# Patient Record
Sex: FEMALE | Race: WHITE | NOT HISPANIC OR LATINO | URBAN - METROPOLITAN AREA
[De-identification: names, ages, dates, MRNs, and addresses within clinical notes are randomized per-mention and may not be internally consistent; named-entity substitution may affect disease eponyms.]

---

## 2023-05-26 LAB
ALANINE AMINOTRANSFERASE (SGPT) (U/L) IN SER/PLAS: 10 U/L (ref 7–45)
ALBUMIN (G/DL) IN SER/PLAS: 3.8 G/DL (ref 3.4–5)
ALKALINE PHOSPHATASE (U/L) IN SER/PLAS: 48 U/L (ref 33–136)
ANION GAP IN SER/PLAS: 10 MMOL/L (ref 10–20)
ASPARTATE AMINOTRANSFERASE (SGOT) (U/L) IN SER/PLAS: 15 U/L (ref 9–39)
BASOPHILS (10*3/UL) IN BLOOD BY AUTOMATED COUNT: 0.09 X10E9/L (ref 0–0.1)
BASOPHILS/100 LEUKOCYTES IN BLOOD BY AUTOMATED COUNT: 1.2 % (ref 0–2)
BILIRUBIN TOTAL (MG/DL) IN SER/PLAS: 0.4 MG/DL (ref 0–1.2)
CALCIUM (MG/DL) IN SER/PLAS: 9.5 MG/DL (ref 8.6–10.3)
CARBON DIOXIDE, TOTAL (MMOL/L) IN SER/PLAS: 28 MMOL/L (ref 21–32)
CHLORIDE (MMOL/L) IN SER/PLAS: 107 MMOL/L (ref 98–107)
CHOLESTEROL (MG/DL) IN SER/PLAS: 179 MG/DL (ref 0–199)
CHOLESTEROL IN HDL (MG/DL) IN SER/PLAS: 42.1 MG/DL
CHOLESTEROL/HDL RATIO: 4.3
CREATININE (MG/DL) IN SER/PLAS: 0.69 MG/DL (ref 0.5–1.05)
EOSINOPHILS (10*3/UL) IN BLOOD BY AUTOMATED COUNT: 0.2 X10E9/L (ref 0–0.7)
EOSINOPHILS/100 LEUKOCYTES IN BLOOD BY AUTOMATED COUNT: 2.6 % (ref 0–6)
ERYTHROCYTE DISTRIBUTION WIDTH (RATIO) BY AUTOMATED COUNT: 13.8 % (ref 11.5–14.5)
ERYTHROCYTE MEAN CORPUSCULAR HEMOGLOBIN CONCENTRATION (G/DL) BY AUTOMATED: 32.4 G/DL (ref 32–36)
ERYTHROCYTE MEAN CORPUSCULAR VOLUME (FL) BY AUTOMATED COUNT: 95 FL (ref 80–100)
ERYTHROCYTES (10*6/UL) IN BLOOD BY AUTOMATED COUNT: 4.31 X10E12/L (ref 4–5.2)
GFR FEMALE: >90 ML/MIN/1.73M2
GLUCOSE (MG/DL) IN SER/PLAS: 93 MG/DL (ref 74–99)
HEMATOCRIT (%) IN BLOOD BY AUTOMATED COUNT: 41 % (ref 36–46)
HEMOGLOBIN (G/DL) IN BLOOD: 13.3 G/DL (ref 12–16)
IMMATURE GRANULOCYTES/100 LEUKOCYTES IN BLOOD BY AUTOMATED COUNT: 0.1 % (ref 0–0.9)
LDL: 113 MG/DL (ref 0–99)
LEUKOCYTES (10*3/UL) IN BLOOD BY AUTOMATED COUNT: 7.8 X10E9/L (ref 4.4–11.3)
LYMPHOCYTES (10*3/UL) IN BLOOD BY AUTOMATED COUNT: 2.64 X10E9/L (ref 1.2–4.8)
LYMPHOCYTES/100 LEUKOCYTES IN BLOOD BY AUTOMATED COUNT: 33.8 % (ref 13–44)
MONOCYTES (10*3/UL) IN BLOOD BY AUTOMATED COUNT: 0.49 X10E9/L (ref 0.1–1)
MONOCYTES/100 LEUKOCYTES IN BLOOD BY AUTOMATED COUNT: 6.3 % (ref 2–10)
NEUTROPHILS (10*3/UL) IN BLOOD BY AUTOMATED COUNT: 4.38 X10E9/L (ref 1.2–7.7)
NEUTROPHILS/100 LEUKOCYTES IN BLOOD BY AUTOMATED COUNT: 56 % (ref 40–80)
PLATELETS (10*3/UL) IN BLOOD AUTOMATED COUNT: 158 X10E9/L (ref 150–450)
POTASSIUM (MMOL/L) IN SER/PLAS: 4.7 MMOL/L (ref 3.5–5.3)
PROTEIN TOTAL: 6.8 G/DL (ref 6.4–8.2)
SODIUM (MMOL/L) IN SER/PLAS: 140 MMOL/L (ref 136–145)
THYROTROPIN (MIU/L) IN SER/PLAS BY DETECTION LIMIT <= 0.05 MIU/L: 2.92 MIU/L (ref 0.44–3.98)
TRIGLYCERIDE (MG/DL) IN SER/PLAS: 118 MG/DL (ref 0–149)
UREA NITROGEN (MG/DL) IN SER/PLAS: 13 MG/DL (ref 6–23)
VLDL: 24 MG/DL (ref 0–40)

## 2023-07-27 ENCOUNTER — HOSPITAL ENCOUNTER (OUTPATIENT)
Dept: DATA CONVERSION | Facility: HOSPITAL | Age: 63
End: 2023-07-27
Attending: SURGERY | Admitting: SURGERY

## 2023-07-27 DIAGNOSIS — K43.9 VENTRAL HERNIA WITHOUT OBSTRUCTION OR GANGRENE: ICD-10-CM

## 2023-07-28 LAB
ATRIAL RATE: 52 BPM
P AXIS: 75 DEGREES
PR INTERVAL: 156 MS
Q ONSET: 254 MS
QRS COUNT: 8 BEATS
QRS DURATION: 97 MS
QT INTERVAL: 443 MS
QTC CALCULATION(BAZETT): 412 MS
QTC FREDERICIA: 422 MS
R AXIS: 40 DEGREES
T AXIS: 58 DEGREES
T OFFSET: 475 MS
VENTRICULAR RATE: 52 BPM

## 2023-09-29 VITALS — HEIGHT: 67 IN | BODY MASS INDEX: 22.66 KG/M2 | WEIGHT: 144.4 LBS

## 2023-09-30 NOTE — H&P
History & Physical Reviewed:   I have reviewed the History and Physical dated:  19-Jul-2023   History and Physical reviewed and relevant findings noted. Patient examined to review pertinent physical  findings.: No significant changes   Home Medications Reviewed: no changes noted   Allergies Reviewed: no changes noted       ERAS (Enhanced Recovery After Surgery):  ·  ERAS Patient: no     Consent:   COVID-19 Consent:  ·  COVID-19 Risk Consent Surgeon has reviewed key risks related to the risk of yemi COVID-19 and if they contract COVID-19 what the risks are.       Electronic Signatures:  Mirela Garcia)  (Signed 27-Jul-2023 09:00)   Authored: History & Physical Reviewed, ERAS, Consent,  Note Completion      Last Updated: 27-Jul-2023 09:00 by Mirela Garcia)

## 2023-10-02 NOTE — OP NOTE
PROCEDURE DETAILS    Preoperative Diagnosis:  Epigastric hernia    Postoperative Diagnosis:  Epigastric hernia    Surgeon: Mirela Garcia  Resident/Fellow/Other Assistant: Allan Fung    Procedure:  1.  open ventral hernia repair with mesh    Anesthesia: Costa  Estimated Blood Loss: 5  Findings: 1.5 x 1.5 cm fascial defect with preperitoneal protruding through the defect.  Specimens(s) Collected: no,     Complications: none  IV Fluids: 600  Patient Returned To/Condition: To PACU in stable condition        Operative Report:   Indication for the procedure: Mimi is a 63-year-old white female who presented with a palpable bulge in the mid epigastric region.  On physical examination,  this was a palpable adipose mass.  It was discussed that there may be a small ventral hernia below the mass.  Therefore, the benefits and risk of an open ventral hernia repair with mesh surgery was reviewed with the patient.  The area was marked in the  preop holding area and confirmed by the patient.    Details of the procedure: The patient was brought into the operating room and was laid in the supine position.  After placement under general anesthesia, MANDI hose and SCDs were placed on bilateral lower extremities.  The abdomen then was prepped with  ChloraPrep and draped in usual sterile fashion.  A pause was taken the correct patient procedure were identified.  At this time half percent Marcaine with epinephrine was used to locally anesthetize the area.  A 3 cm vertical incision was made in the  mid epigastric region right over the area of the palpable mass.  This incision was carried down to the level of the adipose tissue.  Using finger dissection, the adipose tissue associated with the hernia was dissected away from the surrounding soft tissue.   This did appear to be a small epigastric hernia which contained preperitoneal fat.  The hernia sac was removed and amputated at the level of the fascial defect.  Part of the  adipose tissue was amputated.  There was no bleeding on the edge.  The rest  of the adipose tissue was reintroduced into the abdominal cavity.  Using digital manipulation, the undersurface of the posterior fascia was palpated and a few adhesions were broken up.  A 4.3 [place] mesh with strap then was used to repair the hernia defect.   The mesh was introduced into the intra-abdominal cavity with the rough side against the posterior fascia, and the smooth side against the omentum/liver edge.  The mesh was sewn in position using 2 figure-of-eight stitches of 0 Ethibond sutures.  The  sutures incorporated the mesh into the fascial closure.  There was fascia covering all of the mesh at the end.  The strap was cut close to the fascial edge defect, and the sutures were tied.  Half percent Marcaine was injected into the fascia for postoperative  pain control.  The wound was irrigated.  No evidence of bleeding.  The deep soft tissue was reapproximated using 2 interrupted stitches of 2-0 Vicryl suture.  The skin was closed using a running subcuticular stitch of 4-0 Vicryl suture.  The wound then  was dressed with Steri-Strips, 4 x 4 and tape.  She tolerated the procedure well.  She then was awoken from general anesthesia and taken to the recovery room in stable condition.    Complications: None  Counts: Correct x2  Drains: None.                        Attestation:   Note Completion:  Attending Attestation I was present for the entire procedure         Electronic Signatures:  Mirela Garcia)  (Signed 27-Jul-2023 10:18)   Authored: Post-Operative Note, Chart Review, Note Completion      Last Updated: 27-Jul-2023 10:18 by Mirela Garcia)

## 2024-05-29 ENCOUNTER — LAB (OUTPATIENT)
Dept: LAB | Facility: LAB | Age: 64
End: 2024-05-29
Payer: MEDICARE

## 2024-05-29 ENCOUNTER — APPOINTMENT (OUTPATIENT)
Dept: RADIOLOGY | Facility: HOSPITAL | Age: 64
End: 2024-05-29
Payer: MEDICARE

## 2024-05-29 DIAGNOSIS — K58.1 IRRITABLE BOWEL SYNDROME WITH CONSTIPATION: ICD-10-CM

## 2024-05-29 DIAGNOSIS — Z12.31 ENCOUNTER FOR SCREENING MAMMOGRAM FOR MALIGNANT NEOPLASM OF BREAST: ICD-10-CM

## 2024-05-29 DIAGNOSIS — M25.50 PAIN IN UNSPECIFIED JOINT: ICD-10-CM

## 2024-05-29 DIAGNOSIS — R14.0 ABDOMINAL DISTENSION (GASEOUS): ICD-10-CM

## 2024-05-29 DIAGNOSIS — Z00.00 ENCOUNTER FOR GENERAL ADULT MEDICAL EXAMINATION WITHOUT ABNORMAL FINDINGS: Primary | ICD-10-CM

## 2024-05-29 DIAGNOSIS — N64.89 OTHER SPECIFIED DISORDERS OF BREAST: ICD-10-CM

## 2024-05-29 DIAGNOSIS — F41.9 ANXIETY DISORDER, UNSPECIFIED: ICD-10-CM

## 2024-05-29 LAB
ALBUMIN SERPL BCP-MCNC: 4 G/DL (ref 3.4–5)
ALP SERPL-CCNC: 54 U/L (ref 33–136)
ALT SERPL W P-5'-P-CCNC: 11 U/L (ref 7–45)
ANION GAP SERPL CALC-SCNC: 10 MMOL/L (ref 10–20)
AST SERPL W P-5'-P-CCNC: 15 U/L (ref 9–39)
BASOPHILS # BLD AUTO: 0.06 X10*3/UL (ref 0–0.1)
BASOPHILS NFR BLD AUTO: 0.8 %
BILIRUB SERPL-MCNC: 0.3 MG/DL (ref 0–1.2)
BUN SERPL-MCNC: 25 MG/DL (ref 6–23)
CALCIUM SERPL-MCNC: 9.6 MG/DL (ref 8.6–10.3)
CHLORIDE SERPL-SCNC: 102 MMOL/L (ref 98–107)
CHOLEST SERPL-MCNC: 192 MG/DL (ref 0–199)
CHOLESTEROL/HDL RATIO: 4.4
CO2 SERPL-SCNC: 29 MMOL/L (ref 21–32)
CREAT SERPL-MCNC: 0.84 MG/DL (ref 0.5–1.05)
EGFRCR SERPLBLD CKD-EPI 2021: 77 ML/MIN/1.73M*2
EOSINOPHIL # BLD AUTO: 0.22 X10*3/UL (ref 0–0.7)
EOSINOPHIL NFR BLD AUTO: 3 %
ERYTHROCYTE [DISTWIDTH] IN BLOOD BY AUTOMATED COUNT: 13 % (ref 11.5–14.5)
GLUCOSE SERPL-MCNC: 104 MG/DL (ref 74–99)
HCT VFR BLD AUTO: 43.6 % (ref 36–46)
HDLC SERPL-MCNC: 43.6 MG/DL
HGB BLD-MCNC: 14.4 G/DL (ref 12–16)
IMM GRANULOCYTES # BLD AUTO: 0.01 X10*3/UL (ref 0–0.7)
IMM GRANULOCYTES NFR BLD AUTO: 0.1 % (ref 0–0.9)
LDLC SERPL CALC-MCNC: 125 MG/DL
LYMPHOCYTES # BLD AUTO: 2.66 X10*3/UL (ref 1.2–4.8)
LYMPHOCYTES NFR BLD AUTO: 35.8 %
MCH RBC QN AUTO: 30.8 PG (ref 26–34)
MCHC RBC AUTO-ENTMCNC: 33 G/DL (ref 32–36)
MCV RBC AUTO: 93 FL (ref 80–100)
MONOCYTES # BLD AUTO: 0.59 X10*3/UL (ref 0.1–1)
MONOCYTES NFR BLD AUTO: 7.9 %
NEUTROPHILS # BLD AUTO: 3.89 X10*3/UL (ref 1.2–7.7)
NEUTROPHILS NFR BLD AUTO: 52.4 %
NON HDL CHOLESTEROL: 148 MG/DL (ref 0–149)
NRBC BLD-RTO: 0 /100 WBCS (ref 0–0)
PLATELET # BLD AUTO: 174 X10*3/UL (ref 150–450)
POTASSIUM SERPL-SCNC: 5.1 MMOL/L (ref 3.5–5.3)
PROT SERPL-MCNC: 6.8 G/DL (ref 6.4–8.2)
RBC # BLD AUTO: 4.67 X10*6/UL (ref 4–5.2)
SODIUM SERPL-SCNC: 136 MMOL/L (ref 136–145)
TRIGL SERPL-MCNC: 117 MG/DL (ref 0–149)
TSH SERPL-ACNC: 4.83 MIU/L (ref 0.44–3.98)
VLDL: 23 MG/DL (ref 0–40)
WBC # BLD AUTO: 7.4 X10*3/UL (ref 4.4–11.3)

## 2024-05-29 PROCEDURE — 80061 LIPID PANEL: CPT

## 2024-05-29 PROCEDURE — 80053 COMPREHEN METABOLIC PANEL: CPT

## 2024-05-29 PROCEDURE — 36415 COLL VENOUS BLD VENIPUNCTURE: CPT

## 2024-05-29 PROCEDURE — 85025 COMPLETE CBC W/AUTO DIFF WBC: CPT

## 2024-05-29 PROCEDURE — 84443 ASSAY THYROID STIM HORMONE: CPT

## 2024-06-12 ENCOUNTER — HOSPITAL ENCOUNTER (OUTPATIENT)
Dept: RADIOLOGY | Facility: HOSPITAL | Age: 64
Discharge: HOME | End: 2024-06-12
Payer: COMMERCIAL

## 2024-06-12 VITALS — WEIGHT: 145 LBS | HEIGHT: 67 IN | BODY MASS INDEX: 22.76 KG/M2

## 2024-06-12 DIAGNOSIS — Z12.31 ENCOUNTER FOR SCREENING MAMMOGRAM FOR MALIGNANT NEOPLASM OF BREAST: ICD-10-CM

## 2024-06-12 PROCEDURE — 77063 BREAST TOMOSYNTHESIS BI: CPT

## 2024-06-12 PROCEDURE — 77067 SCR MAMMO BI INCL CAD: CPT

## 2024-10-13 ENCOUNTER — OFFICE VISIT (OUTPATIENT)
Dept: URGENT CARE | Age: 64
End: 2024-10-13
Payer: MEDICARE

## 2024-10-13 VITALS
SYSTOLIC BLOOD PRESSURE: 126 MMHG | OXYGEN SATURATION: 96 % | RESPIRATION RATE: 18 BRPM | HEART RATE: 55 BPM | DIASTOLIC BLOOD PRESSURE: 81 MMHG

## 2024-10-13 DIAGNOSIS — R10.10 PAIN OF UPPER ABDOMEN: Primary | ICD-10-CM

## 2024-10-13 RX ORDER — ESTRADIOL 0.05 MG/D
PATCH TRANSDERMAL
COMMUNITY

## 2024-10-13 RX ORDER — ALPRAZOLAM 0.5 MG/1
0.5 TABLET ORAL 4 TIMES DAILY
COMMUNITY

## 2024-10-13 ASSESSMENT — ENCOUNTER SYMPTOMS
APPETITE CHANGE: 1
VOMITING: 1
ABDOMINAL PAIN: 1
CONSTIPATION: 1

## 2024-10-13 NOTE — PROGRESS NOTES
Subjective   Patient ID: Mimi Trivedi is a 64 y.o. female. They present today with a chief complaint of Vomiting and Abdominal Pain (4 times in the past 24 hours , last BM 10 days out. Pain 10/10 /Current ventral hernia ).    History of Present Illness  Patient describes 24 hours of abdominal she has not had a regular bowel movement.  Endorses a small this morning after taking juice.  She has had vomiting.  She has a history of multiple abdominal surgeries in the past.      Vomiting  Associated symptoms: abdominal pain    Abdominal Pain  Associated symptoms include constipation and vomiting.       Past Medical History  Allergies as of 10/13/2024    (No Known Allergies)       (Not in a hospital admission)       Past Medical History:   Diagnosis Date    Personal history of other mental and behavioral disorders     History of anxiety       Past Surgical History:   Procedure Laterality Date    OTHER SURGICAL HISTORY  03/10/2021    Hysterectomy total    OTHER SURGICAL HISTORY  03/10/2021    Nephrectomy    OTHER SURGICAL HISTORY  05/24/2022    Colonoscopy            Review of Systems  Review of Systems   Constitutional:  Positive for appetite change.   Gastrointestinal:  Positive for abdominal pain, constipation and vomiting.                                  Objective    Vitals:    10/13/24 0913   BP: 126/81   Pulse: 55   Resp: 18   SpO2: 96%     No LMP recorded. Patient has had a hysterectomy.    Physical Exam  Vitals and nursing note reviewed.   Constitutional:       General: She is not in acute distress.     Appearance: Normal appearance. She is normal weight. She is not ill-appearing or toxic-appearing.      Comments: Alert pleasant cooperative   HENT:      Head: Normocephalic and atraumatic.      Nose: Nose normal.      Mouth/Throat:      Mouth: Mucous membranes are moist.   Cardiovascular:      Rate and Rhythm: Normal rate.   Abdominal:      General: Abdomen is flat.      Palpations: Abdomen is soft.       Tenderness: There is abdominal tenderness.   Skin:     General: Skin is warm and dry.   Neurological:      Mental Status: She is alert.         Procedures    Point of Care Test & Imaging Results from this visit  No results found for this visit on 10/13/24.   No results found.    Diagnostic study results (if any) were reviewed by CORKY Figueroa.    Assessment/Plan   Allergies, medications, history, and pertinent labs/EKGs/Imaging reviewed by CORKY Figueroa.     Medical Decision Making  This is a no charge visit.    History and physical exam are consistent with a possible acute abdomen.  Differential includes small bowel obstruction which needs to be ruled out.  She was advised to go to the emergency department.  Her  is here and will drive her.    Orders and Diagnoses  There are no diagnoses linked to this encounter.    Medical Admin Record      Patient disposition: ED    Electronically signed by CORKY Figueroa  9:17 AM

## 2024-10-21 ENCOUNTER — APPOINTMENT (OUTPATIENT)
Dept: RADIOLOGY | Facility: HOSPITAL | Age: 64
End: 2024-10-21
Payer: MEDICARE

## 2024-10-21 ENCOUNTER — HOSPITAL ENCOUNTER (EMERGENCY)
Facility: HOSPITAL | Age: 64
Discharge: HOME | End: 2024-10-21
Payer: MEDICARE

## 2024-10-21 VITALS
OXYGEN SATURATION: 98 % | WEIGHT: 144 LBS | TEMPERATURE: 98.5 F | RESPIRATION RATE: 18 BRPM | HEART RATE: 56 BPM | SYSTOLIC BLOOD PRESSURE: 130 MMHG | HEIGHT: 67 IN | BODY MASS INDEX: 22.6 KG/M2 | DIASTOLIC BLOOD PRESSURE: 81 MMHG

## 2024-10-21 DIAGNOSIS — K59.00 CONSTIPATION, UNSPECIFIED CONSTIPATION TYPE: Primary | ICD-10-CM

## 2024-10-21 LAB
ALBUMIN SERPL BCP-MCNC: 4.1 G/DL (ref 3.4–5)
ALP SERPL-CCNC: 64 U/L (ref 33–136)
ALT SERPL W P-5'-P-CCNC: 10 U/L (ref 7–45)
ANION GAP SERPL CALC-SCNC: 11 MMOL/L (ref 10–20)
AST SERPL W P-5'-P-CCNC: 14 U/L (ref 9–39)
BASOPHILS # BLD AUTO: 0.05 X10*3/UL (ref 0–0.1)
BASOPHILS NFR BLD AUTO: 0.6 %
BILIRUB SERPL-MCNC: 0.2 MG/DL (ref 0–1.2)
BUN SERPL-MCNC: 12 MG/DL (ref 6–23)
CALCIUM SERPL-MCNC: 9 MG/DL (ref 8.6–10.3)
CHLORIDE SERPL-SCNC: 101 MMOL/L (ref 98–107)
CO2 SERPL-SCNC: 29 MMOL/L (ref 21–32)
CREAT SERPL-MCNC: 0.79 MG/DL (ref 0.5–1.05)
EGFRCR SERPLBLD CKD-EPI 2021: 83 ML/MIN/1.73M*2
EOSINOPHIL # BLD AUTO: 0.15 X10*3/UL (ref 0–0.7)
EOSINOPHIL NFR BLD AUTO: 1.7 %
ERYTHROCYTE [DISTWIDTH] IN BLOOD BY AUTOMATED COUNT: 13.2 % (ref 11.5–14.5)
GLUCOSE SERPL-MCNC: 112 MG/DL (ref 74–99)
HCT VFR BLD AUTO: 41.1 % (ref 36–46)
HGB BLD-MCNC: 13.7 G/DL (ref 12–16)
IMM GRANULOCYTES # BLD AUTO: 0.02 X10*3/UL (ref 0–0.7)
IMM GRANULOCYTES NFR BLD AUTO: 0.2 % (ref 0–0.9)
LACTATE SERPL-SCNC: 0.8 MMOL/L (ref 0.4–2)
LIPASE SERPL-CCNC: 46 U/L (ref 9–82)
LYMPHOCYTES # BLD AUTO: 2.76 X10*3/UL (ref 1.2–4.8)
LYMPHOCYTES NFR BLD AUTO: 31 %
MCH RBC QN AUTO: 30.6 PG (ref 26–34)
MCHC RBC AUTO-ENTMCNC: 33.3 G/DL (ref 32–36)
MCV RBC AUTO: 92 FL (ref 80–100)
MONOCYTES # BLD AUTO: 0.56 X10*3/UL (ref 0.1–1)
MONOCYTES NFR BLD AUTO: 6.3 %
NEUTROPHILS # BLD AUTO: 5.37 X10*3/UL (ref 1.2–7.7)
NEUTROPHILS NFR BLD AUTO: 60.2 %
NRBC BLD-RTO: 0 /100 WBCS (ref 0–0)
PLATELET # BLD AUTO: 157 X10*3/UL (ref 150–450)
POTASSIUM SERPL-SCNC: 3.7 MMOL/L (ref 3.5–5.3)
PROT SERPL-MCNC: 7.3 G/DL (ref 6.4–8.2)
RBC # BLD AUTO: 4.47 X10*6/UL (ref 4–5.2)
RBC MORPH BLD: NORMAL
SODIUM SERPL-SCNC: 137 MMOL/L (ref 136–145)
WBC # BLD AUTO: 8.9 X10*3/UL (ref 4.4–11.3)

## 2024-10-21 PROCEDURE — 2550000001 HC RX 255 CONTRASTS: Performed by: NURSE PRACTITIONER

## 2024-10-21 PROCEDURE — 74177 CT ABD & PELVIS W/CONTRAST: CPT | Performed by: STUDENT IN AN ORGANIZED HEALTH CARE EDUCATION/TRAINING PROGRAM

## 2024-10-21 PROCEDURE — 83690 ASSAY OF LIPASE: CPT | Performed by: NURSE PRACTITIONER

## 2024-10-21 PROCEDURE — 80053 COMPREHEN METABOLIC PANEL: CPT | Performed by: NURSE PRACTITIONER

## 2024-10-21 PROCEDURE — 83605 ASSAY OF LACTIC ACID: CPT | Performed by: NURSE PRACTITIONER

## 2024-10-21 PROCEDURE — 2500000004 HC RX 250 GENERAL PHARMACY W/ HCPCS (ALT 636 FOR OP/ED): Performed by: NURSE PRACTITIONER

## 2024-10-21 PROCEDURE — 96375 TX/PRO/DX INJ NEW DRUG ADDON: CPT

## 2024-10-21 PROCEDURE — 99284 EMERGENCY DEPT VISIT MOD MDM: CPT | Mod: 25

## 2024-10-21 PROCEDURE — 85025 COMPLETE CBC W/AUTO DIFF WBC: CPT | Performed by: NURSE PRACTITIONER

## 2024-10-21 PROCEDURE — 36415 COLL VENOUS BLD VENIPUNCTURE: CPT | Performed by: NURSE PRACTITIONER

## 2024-10-21 PROCEDURE — 74177 CT ABD & PELVIS W/CONTRAST: CPT

## 2024-10-21 PROCEDURE — 96374 THER/PROPH/DIAG INJ IV PUSH: CPT | Mod: 59

## 2024-10-21 RX ORDER — MORPHINE SULFATE 4 MG/ML
4 INJECTION INTRAVENOUS ONCE
Status: COMPLETED | OUTPATIENT
Start: 2024-10-21 | End: 2024-10-21

## 2024-10-21 RX ORDER — ONDANSETRON HYDROCHLORIDE 2 MG/ML
4 INJECTION, SOLUTION INTRAVENOUS ONCE
Status: COMPLETED | OUTPATIENT
Start: 2024-10-21 | End: 2024-10-21

## 2024-10-21 RX ORDER — LACTULOSE 10 G/15ML
30 SOLUTION ORAL DAILY PRN
Qty: 135 ML | Refills: 0 | Status: SHIPPED | OUTPATIENT
Start: 2024-10-21 | End: 2024-10-24

## 2024-10-21 ASSESSMENT — COLUMBIA-SUICIDE SEVERITY RATING SCALE - C-SSRS
2. HAVE YOU ACTUALLY HAD ANY THOUGHTS OF KILLING YOURSELF?: NO
6. HAVE YOU EVER DONE ANYTHING, STARTED TO DO ANYTHING, OR PREPARED TO DO ANYTHING TO END YOUR LIFE?: NO
1. IN THE PAST MONTH, HAVE YOU WISHED YOU WERE DEAD OR WISHED YOU COULD GO TO SLEEP AND NOT WAKE UP?: NO

## 2024-10-21 ASSESSMENT — PAIN DESCRIPTION - PAIN TYPE: TYPE: ACUTE PAIN

## 2024-10-21 ASSESSMENT — LIFESTYLE VARIABLES
HAVE YOU EVER FELT YOU SHOULD CUT DOWN ON YOUR DRINKING: NO
HAVE PEOPLE ANNOYED YOU BY CRITICIZING YOUR DRINKING: NO
TOTAL SCORE: 0
EVER FELT BAD OR GUILTY ABOUT YOUR DRINKING: NO
EVER HAD A DRINK FIRST THING IN THE MORNING TO STEADY YOUR NERVES TO GET RID OF A HANGOVER: NO

## 2024-10-21 ASSESSMENT — PAIN - FUNCTIONAL ASSESSMENT: PAIN_FUNCTIONAL_ASSESSMENT: 0-10

## 2024-10-21 ASSESSMENT — PAIN DESCRIPTION - FREQUENCY: FREQUENCY: CONSTANT/CONTINUOUS

## 2024-10-21 ASSESSMENT — PAIN DESCRIPTION - DESCRIPTORS: DESCRIPTORS: ACHING

## 2024-10-21 ASSESSMENT — PAIN SCALES - GENERAL: PAINLEVEL_OUTOF10: 8

## 2024-10-21 ASSESSMENT — PAIN DESCRIPTION - LOCATION: LOCATION: ABDOMEN

## 2024-10-21 NOTE — ED PROVIDER NOTES
HPI   Chief Complaint   Patient presents with    constipation       This is a 64-year-old  female presenting to the emergency room with complaints of constipation.  Patient reports she has not had a bowel movement in 3 weeks.  She has tried homeopathic and over-the-counter medications, except laxatives.  The patient reports that she does not wish to take laxatives because she heard that it affects the kidneys.  The patient reported that she felt nausea and vomiting last week.  She saw the urgent care and was put on Reglan.  The patient took Reglan since Friday and has not had a bowel movement. She called her PCP and was told her to discontinue the medication.  They were concerned that she may have an obstruction and was told to come to the emergency room.  She denies any fevers, chills, or cold-like symptoms.  She denies any new or different medications.  The patient reports she is not having any rectal pain or pressure.  The patient reports that she has been able to pass small amounts of gas.      History provided by:  Patient   used: No            Patient History   Past Medical History:   Diagnosis Date    Personal history of other mental and behavioral disorders     History of anxiety     Past Surgical History:   Procedure Laterality Date    OTHER SURGICAL HISTORY  03/10/2021    Hysterectomy total    OTHER SURGICAL HISTORY  03/10/2021    Nephrectomy    OTHER SURGICAL HISTORY  05/24/2022    Colonoscopy     No family history on file.  Social History     Tobacco Use    Smoking status: Unknown    Smokeless tobacco: Not on file   Substance Use Topics    Alcohol use: Not on file    Drug use: Not on file       Physical Exam   ED Triage Vitals [10/21/24 1801]   Temperature Heart Rate Respirations BP   36.9 °C (98.5 °F) 87 16 105/69      Pulse Ox Temp Source Heart Rate Source Patient Position   98 % Temporal Monitor Sitting      BP Location FiO2 (%)     Left arm --       Physical Exam  Vitals  and nursing note reviewed.   Constitutional:       Appearance: Normal appearance. She is normal weight.   HENT:      Head: Normocephalic and atraumatic.      Right Ear: Tympanic membrane normal.      Left Ear: Tympanic membrane normal.      Nose: Nose normal.      Mouth/Throat:      Mouth: Mucous membranes are moist.      Pharynx: Oropharynx is clear.   Eyes:      Extraocular Movements: Extraocular movements intact.      Conjunctiva/sclera: Conjunctivae normal.      Pupils: Pupils are equal, round, and reactive to light.   Cardiovascular:      Rate and Rhythm: Normal rate and regular rhythm.      Pulses: Normal pulses.   Pulmonary:      Effort: Pulmonary effort is normal.      Breath sounds: Normal breath sounds.   Abdominal:      General: Bowel sounds are normal.      Palpations: Abdomen is soft.      Tenderness: There is abdominal tenderness in the periumbilical area.   Genitourinary:     Comments: No CVA tenderness or pubic pain.  Musculoskeletal:         General: Normal range of motion.   Skin:     General: Skin is warm and dry.      Capillary Refill: Capillary refill takes less than 2 seconds.   Neurological:      General: No focal deficit present.      Mental Status: She is alert and oriented to person, place, and time.   Psychiatric:         Mood and Affect: Mood normal.         Judgment: Judgment normal.           ED Course & MDM   Diagnoses as of 10/21/24 2134   Constipation, unspecified constipation type                 No data recorded     [place] Coma Scale Score: 15 (10/21/24 1803 : Gerri Lewis RN)                           Medical Decision Making  The patient was seen and evaluated by the nurse practitioner, Itzel Vergara.  The patient is presenting to the emergency room with complaints of a 3-week history of constipation.  Differential diagnosis includes: Constipation, IBS, ileus, small bowel obstruction, or other acute process.  A saline lock was established.  Laboratory studies were drawn with  results as noted.  Patient's laboratory studies were largely unremarkable.  Patient was administered 1 L of normal saline wide open for hydration, Zofran 4 mg IVP, morphine 4 mg IVP.  The patient reported improvement of pain symptoms after medication administration.  CAT scan of the abdomen pelvis was performed and was negative for acute obstruction or other acute findings in the abdomen or pelvis.  Patient had above-average stool burden.  The patient was notified of her imaging and laboratory results.  The plan of care was discussed.  Shared decision making was performed.  The patient opted for lactulose.  She was also advised to use MiraLAX twice daily.  She is to increase her oral fluid intake.  She is also being treated for gluten vegetable intake.  The patient was provided a prescription for lactulose.  She was referred to gastroenterology.  She is to return if she has any worsening symptomatology.  The patient was discharged in stable condition with computer discharge instructions given.        Procedure  Procedures     EMMANUEL Mckeon-CNP  10/21/24 9503

## 2025-01-28 ENCOUNTER — APPOINTMENT (OUTPATIENT)
Facility: CLINIC | Age: 65
End: 2025-01-28
Payer: MEDICARE

## 2025-04-14 ENCOUNTER — APPOINTMENT (OUTPATIENT)
Facility: CLINIC | Age: 65
End: 2025-04-14
Payer: MEDICARE

## 2025-05-28 ENCOUNTER — APPOINTMENT (OUTPATIENT)
Facility: CLINIC | Age: 65
End: 2025-05-28
Payer: MEDICARE

## 2025-05-28 VITALS
WEIGHT: 138.6 LBS | DIASTOLIC BLOOD PRESSURE: 74 MMHG | SYSTOLIC BLOOD PRESSURE: 116 MMHG | OXYGEN SATURATION: 97 % | HEART RATE: 63 BPM | BODY MASS INDEX: 21.75 KG/M2 | HEIGHT: 67 IN

## 2025-05-28 DIAGNOSIS — K58.1 IRRITABLE BOWEL SYNDROME WITH CONSTIPATION: Primary | ICD-10-CM

## 2025-05-28 DIAGNOSIS — K59.04 CHRONIC IDIOPATHIC CONSTIPATION: ICD-10-CM

## 2025-05-28 DIAGNOSIS — Z12.11 COLON CANCER SCREENING: ICD-10-CM

## 2025-05-28 PROCEDURE — 1157F ADVNC CARE PLAN IN RCRD: CPT | Performed by: INTERNAL MEDICINE

## 2025-05-28 PROCEDURE — 3008F BODY MASS INDEX DOCD: CPT | Performed by: INTERNAL MEDICINE

## 2025-05-28 PROCEDURE — 99204 OFFICE O/P NEW MOD 45 MIN: CPT | Performed by: INTERNAL MEDICINE

## 2025-05-28 PROCEDURE — 1159F MED LIST DOCD IN RCRD: CPT | Performed by: INTERNAL MEDICINE

## 2025-05-28 RX ORDER — POLYETHYLENE GLYCOL 3350, SODIUM SULFATE ANHYDROUS, SODIUM BICARBONATE, SODIUM CHLORIDE, POTASSIUM CHLORIDE 236; 22.74; 6.74; 5.86; 2.97 G/4L; G/4L; G/4L; G/4L; G/4L
4 POWDER, FOR SOLUTION ORAL ONCE
Qty: 4000 ML | Refills: 0 | Status: SHIPPED | OUTPATIENT
Start: 2025-05-28 | End: 2025-05-28

## 2025-05-28 NOTE — PATIENT INSTRUCTIONS
Trial of Linzess 290s      You have been scheduled for a colonoscopy.  You were given instructions for preparing for this test in the office today.  If you have questions about these instructions, please call my office at [phone].    After your procedure, you can expect me to talk to you to go over the results of the procedure. If polyps were removed I will usually be able to tell you my initial thoughts about those polyps and give you some idea of when you should have another colonoscopy.    If any polyps are removed during your procedure or if any biopsies are obtained those specimens will go to the pathologists to review under the microscope. Once those results are available they will be sent to me electronically to review. These results will also be available to you at that time through [facility]. I briefly review all of these results by the next business day to determine if there is any urgent information that needs to be communicated to you right away or anything that would significantly change what I told you at the time of the procedure. If there is nothing urgent this is usually a good sign and I will then do a more extensive review of the result and send you a letter with my final recommendations within a week of the result becoming available. If you have questions or need additional information I urge you to call the office at [phone], but I do ask for patience as I spend a good portion of each day performing procedures like the one you are scheduled for and during those times I am not able to take or return routine phone calls.    You were also given information regarding the schedule for your procedure including the time that you need to arrive to the endoscopy unit.  You will also be contacted 2-3 day prior to your procedure to confirm the final arrival time.  If you have questions about this or if you need to cancel or change this appointment please call my office at [phone].

## 2025-05-28 NOTE — PROGRESS NOTES
[facility] Gastroenterology    ASSESSMENT and PLAN:       Mimi Trivedi is a 65 y.o. female with a significant past medical history of anxiety who presents for consultation requested by her primary care provider (David Welch MD) for the evaluation of nausea vomiting.       1. IBS_C s  - Hx of nephrectomy   - Patient has failed lifestyle modifications with high-fiber diet, MiraLAX, Colace and senna  - Will trial Linzess to 290 mcg once daily   -       2. Average Risk Colorectal Cancer   - screening colonoscopy for evaluation     Risk and benefits of the endoscopic procedure including bleeding perforation and infection were discussed with patient and they wish to proceed          Andrae Gonzalez DO         Gastroenterology    [facility]            Subjective   HISTORY OF PRESENT ILLNESS:     Chief Complaint  ER Follow-up (ER 10/21/2024-CONSTIPATION/CT abd/pelvis 10/21/24/Patient complains of nausea/vomiting and chronic constipation.  /Colon >10 years ago in [place].)    History Of Present Illness:    Mimi Trivedi is a 65 y.o. female with a significant past medical history of anxiety who presents for consultation requested by her primary care provider (David Welch MD) for the evaluation of nausea vomiting.    Patient she can go weeks without a bowel movement     Patient has tried senna fiber metamucil and MiraLAX.  Patient she ultimately stopped.  She is patient have a bowel movement history [place] score scale of 1-2.  She admitts to some straining as well.    Patient sees an PCP in [place] some pills.     Constiopation is chronic in nature and has been ongoing for yeaers         Hx of nephrectomy             Patient denies any heartburn/GERD, N/V, dysphagia, odynophagia, abdominal pain, diarrhea, constipation, hematemesis, hematochezia, melena, or weight loss.      Endoscopy History:    Colon 10 years prior in [place]     Review of systems:   Review of  "Systems      I performed a complete 10 point review of systems and it is negative except as noted in HPI or above.        PAST HISTORIES:       Past Medical History:  She has a past medical history of Anxiety, Chronic constipation, and Personal history of other mental and behavioral disorders.    Past Surgical History:  She has a past surgical history that includes Other surgical history (03/10/2021); Other surgical history (03/10/2021); Other surgical history (05/24/2022); and Hysterectomy.      Social History:  She reports that she has been smoking cigarettes. She has never used smokeless tobacco. She reports that she does not drink alcohol and does not use drugs.    Family History:  No known GI disease, specifically denies pancreatitis, Crohn's, colon cancer, gastroesophageal cancer, or ulcerative colitis.    Family History[1]     Allergies:  Nitrofurantoin and Nitrofurantoin macrocrystal        Objective   OBJECTIVE:       Last Recorded Vitals:  Vitals:    05/28/25 0901   BP: 116/74   BP Location: Left arm   Patient Position: Sitting   BP Cuff Size: Adult   Pulse: 63   SpO2: 97%   Weight: 62.9 kg (138 lb 9.6 oz)   Height: 1.702 m (5' 7\")     /74 (BP Location: Left arm, Patient Position: Sitting, BP Cuff Size: Adult)   Pulse 63   Ht 1.702 m (5' 7\")   Wt 62.9 kg (138 lb 9.6 oz)   SpO2 97%   BMI 21.71 kg/m²      Physical Exam:    Physical Exam  Vitals reviewed.   Constitutional:       General: She is awake.      Appearance: Normal appearance.   HENT:      Head: Normocephalic.      Mouth/Throat:      Mouth: Mucous membranes are moist.   Eyes:      Extraocular Movements: Extraocular movements intact.   Cardiovascular:      Rate and Rhythm: Normal rate.      Heart sounds: Normal heart sounds.   Pulmonary:      Effort: Pulmonary effort is normal.      Breath sounds: Normal breath sounds.   Abdominal:      General: Bowel sounds are normal.      Palpations: Abdomen is soft.      Tenderness: There is no " "abdominal tenderness. There is no guarding or rebound.      Hernia: No hernia is present.   Musculoskeletal:         General: Normal range of motion.      Cervical back: Neck supple.   Skin:     General: Skin is warm and dry.   Neurological:      General: No focal deficit present.      Mental Status: She is alert.   Psychiatric:         Attention and Perception: Attention and perception normal.         Mood and Affect: Mood normal.         Behavior: Behavior normal.             Home Medications:  Prior to Admission medications    Medication Sig Start Date End Date Taking? Authorizing Provider   ALPRAZolam (Xanax) 0.5 mg tablet Take 0.5 mg by mouth 4 times a day.    Historical Provider, MD   estradiol (Cl[place]ara) 0.05 mg/24 hr patch Place on the skin.    Historical Provider, MD         Relevant Results Recent labs reviewed in the EMR.  Lab Results   Component Value Date    HGB 13.7 10/21/2024    HGB 14.4 05/29/2024    HGB 13.3 05/26/2023    HGB 14.8 03/23/2022    HGB 13.7 02/18/2021    MCV 92 10/21/2024    MCV 93 05/29/2024    MCV 95 05/26/2023    MCV 94 03/23/2022    MCV 95 02/18/2021     10/21/2024     05/29/2024     05/26/2023     03/23/2022     02/18/2021       No results found for: \"FERRITIN\", \"IRON\"    Lab Results   Component Value Date     10/21/2024    K 3.7 10/21/2024     10/21/2024    BUN 12 10/21/2024    CREATININE 0.79 10/21/2024       Lab Results   Component Value Date    BILITOT 0.2 10/21/2024     Lab Results   Component Value Date    ALT 10 10/21/2024    ALT 11 05/29/2024    ALT 10 05/26/2023    ALT 18 03/23/2022    ALT 13 02/18/2021    AST 14 10/21/2024    AST 15 05/29/2024    AST 15 05/26/2023    AST 23 03/23/2022    AST 17 02/18/2021    ALKPHOS 64 10/21/2024    ALKPHOS 54 05/29/2024    ALKPHOS 48 05/26/2023    ALKPHOS 81 03/23/2022    ALKPHOS 76 02/18/2021       No results found for: \"CRP\"    No results found for: \"CALPS\"    Radiology: Reviewed imaging " reviewed in the EMR.  Imaging  Narrative & Impression   Interpreted By:  Mehrdad Rogers,   STUDY:  CT ABDOMEN PELVIS W IV CONTRAST;  10/21/2024 8:28 pm      INDICATION:  Signs/Symptoms:abd pain/constipation.      COMPARISON:  None      ACCESSION NUMBER(S):  [id number]      ORDERING CLINICIAN:  HADLEY GREWAL      TECHNIQUE:  Axial CT images of the abdomen and pelvis with coronal and sagittal  reconstructed images obtained after intravenous administration of  contrast.      FINDINGS:  LOWER CHEST: Unremarkable.  BONES: No acute osseous abnormalities.  ABDOMINAL WALL: Tiny fat containing umbilical hernia.      ABDOMEN:      LIVER: Unremarkable.  BILE DUCTS: Unremarkable.  GALLBLADDER: Unremarkable.  PANCREAS:Unremarkable.  SPLEEN: Unremarkable.  ADRENALS: Unremarkable.  KIDNEYS and URETERS: The left kidney is absent. Physiologic  hypertrophy of the right kidney. No nephrolithiasis or  hydroureteronephrosis. VESSELS: Atherosclerotic calcification of the  aorta and its branches. No aneurysm. Noncalcified atheromatous plaque  results in moderate-to-severe narrowing of the proximal SMA (2/35).  LYMPH NODES: Unremarkable. RETROPERITONEUM/PERITONEUM: Unremarkable.  BOWEL: Above average stool burden. The appendix is not distinctly  visualized but no secondary inflammatory change to suggest acute  appendicitis.      PELVIS:      REPRODUCTIVE ORGANS: Hysterectomy.  BLADDER: Unremarkable.      IMPRESSION:  1. No acute findings in the abdomen or pelvis.  2. Above average stool burden.  3. Extensive atherosclerotic calcification of the aorta and its  branches. Noncalcified atheromatous plaque results in  moderate-to-severe narrowing of the proximal SMA.      MACRO:       Cardiology, Vascular, and Other Imaging  No other imaging results found for the past 7 days             [1]   Family History  Problem Relation Name Age of Onset    Alcohol abuse Sister Myrna Martínez     Alcohol abuse Sister Jen Henrry     Alcohol abuse  Brother Sander Sales     Alcohol abuse Sister Myrna Martínez     Alcohol abuse Sister Jen Sales     Alcohol abuse Brother Sander Sales

## 2025-05-28 NOTE — H&P (VIEW-ONLY)
[facility]    ASSESSMENT and PLAN:       Mimi Trivedi is a 65 y.o. female with a significant past medical history of anxiety who presents for consultation requested by her primary care provider (David Welch MD) for the evaluation of nausea vomiting.       1. IBS_C s  - Hx of nephrectomy   - Patient has failed lifestyle modifications with high-fiber diet, MiraLAX, Colace and senna  - Will trial Linzess to 290 mcg once daily   -       2. Average Risk Colorectal Cancer   - screening colonoscopy for evaluation     Risk and benefits of the endoscopic procedure including bleeding perforation and infection were discussed with patient and they wish to proceed          Andrae Gonzalez DO         Gastroenterology    [facility]            Subjective   HISTORY OF PRESENT ILLNESS:     Chief Complaint  ER Follow-up (ER 10/21/2024-CONSTIPATION/CT abd/pelvis 10/21/24/Patient complains of nausea/vomiting and chronic constipation.  /Colon >10 years ago in [place].)    History Of Present Illness:    Mimi Trivedi is a 65 y.o. female with a significant past medical history of anxiety who presents for consultation requested by her primary care provider (David Welch MD) for the evaluation of nausea vomiting.    Patient she can go weeks without a bowel movement     Patient has tried senna fiber metamucil and MiraLAX.  Patient she ultimately stopped.  She is patient have a bowel movement history [place] score scale of 1-2.  She admitts to some straining as well.    Patient sees an PCP in [place] some pills.     Constiopation is chronic in nature and has been ongoing for yeaers         Hx of nephrectomy             Patient denies any heartburn/GERD, N/V, dysphagia, odynophagia, abdominal pain, diarrhea, constipation, hematemesis, hematochezia, melena, or weight loss.      Endoscopy History:    Colon 10 years prior in [place]     Review of systems:   Review of  "Systems      I performed a complete 10 point review of systems and it is negative except as noted in HPI or above.        PAST HISTORIES:       Past Medical History:  She has a past medical history of Anxiety, Chronic constipation, and Personal history of other mental and behavioral disorders.    Past Surgical History:  She has a past surgical history that includes Other surgical history (03/10/2021); Other surgical history (03/10/2021); Other surgical history (05/24/2022); and Hysterectomy.      Social History:  She reports that she has been smoking cigarettes. She has never used smokeless tobacco. She reports that she does not drink alcohol and does not use drugs.    Family History:  No known GI disease, specifically denies pancreatitis, Crohn's, colon cancer, gastroesophageal cancer, or ulcerative colitis.    Family History[1]     Allergies:  Nitrofurantoin and Nitrofurantoin macrocrystal        Objective   OBJECTIVE:       Last Recorded Vitals:  Vitals:    05/28/25 0901   BP: 116/74   BP Location: Left arm   Patient Position: Sitting   BP Cuff Size: Adult   Pulse: 63   SpO2: 97%   Weight: 62.9 kg (138 lb 9.6 oz)   Height: 1.702 m (5' 7\")     /74 (BP Location: Left arm, Patient Position: Sitting, BP Cuff Size: Adult)   Pulse 63   Ht 1.702 m (5' 7\")   Wt 62.9 kg (138 lb 9.6 oz)   SpO2 97%   BMI 21.71 kg/m²      Physical Exam:    Physical Exam  Vitals reviewed.   Constitutional:       General: She is awake.      Appearance: Normal appearance.   HENT:      Head: Normocephalic.      Mouth/Throat:      Mouth: Mucous membranes are moist.   Eyes:      Extraocular Movements: Extraocular movements intact.   Cardiovascular:      Rate and Rhythm: Normal rate.      Heart sounds: Normal heart sounds.   Pulmonary:      Effort: Pulmonary effort is normal.      Breath sounds: Normal breath sounds.   Abdominal:      General: Bowel sounds are normal.      Palpations: Abdomen is soft.      Tenderness: There is no " "abdominal tenderness. There is no guarding or rebound.      Hernia: No hernia is present.   Musculoskeletal:         General: Normal range of motion.      Cervical back: Neck supple.   Skin:     General: Skin is warm and dry.   Neurological:      General: No focal deficit present.      Mental Status: She is alert.   Psychiatric:         Attention and Perception: Attention and perception normal.         Mood and Affect: Mood normal.         Behavior: Behavior normal.             Home Medications:  Prior to Admission medications    Medication Sig Start Date End Date Taking? Authorizing Provider   ALPRAZolam (Xanax) 0.5 mg tablet Take 0.5 mg by mouth 4 times a day.    Historical Provider, MD   estradiol (Climara) 0.05 mg/24 hr patch Place on the skin.    Historical Provider, MD         Relevant Results Recent labs reviewed in the EMR.  Lab Results   Component Value Date    HGB 13.7 10/21/2024    HGB 14.4 05/29/2024    HGB 13.3 05/26/2023    HGB 14.8 03/23/2022    HGB 13.7 02/18/2021    MCV 92 10/21/2024    MCV 93 05/29/2024    MCV 95 05/26/2023    MCV 94 03/23/2022    MCV 95 02/18/2021     10/21/2024     05/29/2024     05/26/2023     03/23/2022     02/18/2021       No results found for: \"FERRITIN\", \"IRON\"    Lab Results   Component Value Date     10/21/2024    K 3.7 10/21/2024     10/21/2024    BUN 12 10/21/2024    CREATININE 0.79 10/21/2024       Lab Results   Component Value Date    BILITOT 0.2 10/21/2024     Lab Results   Component Value Date    ALT 10 10/21/2024    ALT 11 05/29/2024    ALT 10 05/26/2023    ALT 18 03/23/2022    ALT 13 02/18/2021    AST 14 10/21/2024    AST 15 05/29/2024    AST 15 05/26/2023    AST 23 03/23/2022    AST 17 02/18/2021    ALKPHOS 64 10/21/2024    ALKPHOS 54 05/29/2024    ALKPHOS 48 05/26/2023    ALKPHOS 81 03/23/2022    ALKPHOS 76 02/18/2021       No results found for: \"CRP\"    No results found for: \"CALPS\"    Radiology: Reviewed imaging " reviewed in the EMR.  Imaging  Narrative & Impression   Interpreted By:  Mehrdad Rogers,   STUDY:  CT ABDOMEN PELVIS W IV CONTRAST;  10/21/2024 8:28 pm      INDICATION:  Signs/Symptoms:abd pain/constipation.      COMPARISON:  None      ACCESSION NUMBER(S):  [id number]      ORDERING CLINICIAN:  HADLEY GREWAL      TECHNIQUE:  Axial CT images of the abdomen and pelvis with coronal and sagittal  reconstructed images obtained after intravenous administration of  contrast.      FINDINGS:  LOWER CHEST: Unremarkable.  BONES: No acute osseous abnormalities.  ABDOMINAL WALL: Tiny fat containing umbilical hernia.      ABDOMEN:      LIVER: Unremarkable.  BILE DUCTS: Unremarkable.  GALLBLADDER: Unremarkable.  PANCREAS:Unremarkable.  SPLEEN: Unremarkable.  ADRENALS: Unremarkable.  KIDNEYS and URETERS: The left kidney is absent. Physiologic  hypertrophy of the right kidney. No nephrolithiasis or  hydroureteronephrosis. VESSELS: Atherosclerotic calcification of the  aorta and its branches. No aneurysm. Noncalcified atheromatous plaque  results in moderate-to-severe narrowing of the proximal SMA (2/35).  LYMPH NODES: Unremarkable. RETROPERITONEUM/PERITONEUM: Unremarkable.  BOWEL: Above average stool burden. The appendix is not distinctly  visualized but no secondary inflammatory change to suggest acute  appendicitis.      PELVIS:      REPRODUCTIVE ORGANS: Hysterectomy.  BLADDER: Unremarkable.      IMPRESSION:  1. No acute findings in the abdomen or pelvis.  2. Above average stool burden.  3. Extensive atherosclerotic calcification of the aorta and its  branches. Noncalcified atheromatous plaque results in  moderate-to-severe narrowing of the proximal SMA.      MACRO:       Cardiology, Vascular, and Other Imaging  No other imaging results found for the past 7 days             [1]   Family History  Problem Relation Name Age of Onset    Alcohol abuse Sister Myrna Martínez     Alcohol abuse Sister Jen Henrry     Alcohol abuse  Brother Sander Sales     Alcohol abuse Sister Myrna Martínez     Alcohol abuse Sister Jen Sales     Alcohol abuse Brother Sander Sales

## 2025-06-25 ENCOUNTER — ANESTHESIA EVENT (OUTPATIENT)
Dept: GASTROENTEROLOGY | Facility: HOSPITAL | Age: 65
End: 2025-06-25
Payer: MEDICARE

## 2025-06-27 ENCOUNTER — HOSPITAL ENCOUNTER (OUTPATIENT)
Dept: GASTROENTEROLOGY | Facility: HOSPITAL | Age: 65
Discharge: HOME | End: 2025-06-27
Payer: MEDICARE

## 2025-06-27 ENCOUNTER — ANESTHESIA (OUTPATIENT)
Dept: GASTROENTEROLOGY | Facility: HOSPITAL | Age: 65
End: 2025-06-27
Payer: MEDICARE

## 2025-06-27 VITALS
BODY MASS INDEX: 21.46 KG/M2 | WEIGHT: 137 LBS | RESPIRATION RATE: 21 BRPM | OXYGEN SATURATION: 97 % | HEART RATE: 51 BPM | DIASTOLIC BLOOD PRESSURE: 65 MMHG | TEMPERATURE: 97.5 F | SYSTOLIC BLOOD PRESSURE: 118 MMHG

## 2025-06-27 DIAGNOSIS — Z12.11 COLON CANCER SCREENING: ICD-10-CM

## 2025-06-27 PROCEDURE — 7100000009 HC PHASE TWO TIME - INITIAL BASE CHARGE

## 2025-06-27 PROCEDURE — G0121 COLON CA SCRN NOT HI RSK IND: HCPCS | Performed by: INTERNAL MEDICINE

## 2025-06-27 PROCEDURE — 7100000010 HC PHASE TWO TIME - EACH INCREMENTAL 1 MINUTE

## 2025-06-27 PROCEDURE — 45378 DIAGNOSTIC COLONOSCOPY: CPT | Performed by: INTERNAL MEDICINE

## 2025-06-27 PROCEDURE — 2500000004 HC RX 250 GENERAL PHARMACY W/ HCPCS (ALT 636 FOR OP/ED): Performed by: INTERNAL MEDICINE

## 2025-06-27 PROCEDURE — 3700000002 HC GENERAL ANESTHESIA TIME - EACH INCREMENTAL 1 MINUTE

## 2025-06-27 PROCEDURE — 3700000001 HC GENERAL ANESTHESIA TIME - INITIAL BASE CHARGE

## 2025-06-27 PROCEDURE — 2500000004 HC RX 250 GENERAL PHARMACY W/ HCPCS (ALT 636 FOR OP/ED): Mod: JW | Performed by: NURSE ANESTHETIST, CERTIFIED REGISTERED

## 2025-06-27 RX ORDER — GLYCOPYRROLATE 0.2 MG/ML
INJECTION INTRAMUSCULAR; INTRAVENOUS AS NEEDED
Status: DISCONTINUED | OUTPATIENT
Start: 2025-06-27 | End: 2025-06-27

## 2025-06-27 RX ORDER — LIDOCAINE HYDROCHLORIDE 20 MG/ML
INJECTION, SOLUTION EPIDURAL; INFILTRATION; INTRACAUDAL; PERINEURAL AS NEEDED
Status: DISCONTINUED | OUTPATIENT
Start: 2025-06-27 | End: 2025-06-27

## 2025-06-27 RX ORDER — PROPOFOL 10 MG/ML
INJECTION, EMULSION INTRAVENOUS AS NEEDED
Status: DISCONTINUED | OUTPATIENT
Start: 2025-06-27 | End: 2025-06-27

## 2025-06-27 RX ORDER — SODIUM CHLORIDE 9 MG/ML
20 INJECTION, SOLUTION INTRAVENOUS CONTINUOUS
Status: ACTIVE | OUTPATIENT
Start: 2025-06-27 | End: 2025-06-27

## 2025-06-27 RX ADMIN — PROPOFOL 20 MG: 10 INJECTION, EMULSION INTRAVENOUS at 09:34

## 2025-06-27 RX ADMIN — GLYCOPYRROLATE 0.2 MG: 0.2 INJECTION INTRAMUSCULAR; INTRAVENOUS at 09:30

## 2025-06-27 RX ADMIN — SODIUM CHLORIDE 20 ML/HR: 0.9 INJECTION, SOLUTION INTRAVENOUS at 08:52

## 2025-06-27 RX ADMIN — LIDOCAINE HYDROCHLORIDE 50 MG: 20 INJECTION, SOLUTION EPIDURAL; INFILTRATION; INTRACAUDAL; PERINEURAL at 09:21

## 2025-06-27 RX ADMIN — PROPOFOL 20 MG: 10 INJECTION, EMULSION INTRAVENOUS at 09:25

## 2025-06-27 RX ADMIN — PROPOFOL 20 MG: 10 INJECTION, EMULSION INTRAVENOUS at 09:28

## 2025-06-27 RX ADMIN — PROPOFOL 20 MG: 10 INJECTION, EMULSION INTRAVENOUS at 09:37

## 2025-06-27 RX ADMIN — PROPOFOL 20 MG: 10 INJECTION, EMULSION INTRAVENOUS at 09:31

## 2025-06-27 RX ADMIN — PROPOFOL 100 MG: 10 INJECTION, EMULSION INTRAVENOUS at 09:21

## 2025-06-27 SDOH — HEALTH STABILITY: MENTAL HEALTH: CURRENT SMOKER: 1

## 2025-06-27 ASSESSMENT — COLUMBIA-SUICIDE SEVERITY RATING SCALE - C-SSRS
6. HAVE YOU EVER DONE ANYTHING, STARTED TO DO ANYTHING, OR PREPARED TO DO ANYTHING TO END YOUR LIFE?: NO
1. IN THE PAST MONTH, HAVE YOU WISHED YOU WERE DEAD OR WISHED YOU COULD GO TO SLEEP AND NOT WAKE UP?: NO
2. HAVE YOU ACTUALLY HAD ANY THOUGHTS OF KILLING YOURSELF?: NO

## 2025-06-27 ASSESSMENT — PAIN SCALES - GENERAL
PAINLEVEL_OUTOF10: 0 - NO PAIN
PAIN_LEVEL: 0
PAINLEVEL_OUTOF10: 0 - NO PAIN

## 2025-06-27 ASSESSMENT — PAIN - FUNCTIONAL ASSESSMENT
PAIN_FUNCTIONAL_ASSESSMENT: 0-10

## 2025-06-27 NOTE — ANESTHESIA PREPROCEDURE EVALUATION
Patient: Mimi Trivedi    Procedure Information       Date/Time: 25 0930    Scheduled providers: Andrae Gonzalez DO    Procedure: COLONOSCOPY    Location: [facility]            Relevant Problems   Anesthesia (within normal limits)       Clinical information reviewed:   Tobacco  Allergies  Meds   Med Hx  Surg Hx  OB Status  Fam Hx  Soc   Hx        NPO Detail:  NPO/Void Status  Date of Last Liquid: 25  Time of Last Liquid: 0400  Date of Last Solid: 25  Time of Last Solid: 1800  Last Intake Type: Clear fluids         Physical Exam    Airway  Mallampati: III  TM distance: >3 FB  Neck ROM: full  Mouth openin finger widths     Cardiovascular - normal exam   Dental - normal exam     Pulmonary - normal exam   Abdominal - normal exam           Anesthesia Plan    History of general anesthesia?: yes  History of complications of general anesthesia?: no    ASA 2     MAC     The patient is a current smoker.  Patient was previously instructed to abstain from smoking on day of procedure.  Patient did not smoke on day of procedure.    intravenous induction   Anesthetic plan and risks discussed with patient.    Plan discussed with CRNA.

## 2025-06-27 NOTE — ANESTHESIA POSTPROCEDURE EVALUATION
Patient: Mimi Trivedi    Procedure Summary       Date: 06/27/25 Room / Location: [facility]    Anesthesia Start: 0917 Anesthesia Stop: 0944    Procedure: COLONOSCOPY Diagnosis: Colon cancer screening    Scheduled Providers: Andrae Gonzalez DO Responsible Provider: JOAN Davis    Anesthesia Type: MAC ASA Status: 2            Anesthesia Type: MAC    Vitals Value Taken Time   BP 97/50 06/27/25 09:44   Temp 98.0 06/27/25 09:44   Pulse 58 06/27/25 09:44   Resp 16 06/27/25 09:44   SpO2 99% 06/27/25 09:44       Anesthesia Post Evaluation    Patient location during evaluation: PACU  Patient participation: complete - patient participated  Level of consciousness: awake and alert  Pain score: 0  Pain management: adequate  Airway patency: patent  Cardiovascular status: acceptable and hemodynamically stable  Respiratory status: acceptable and nasal cannula  Hydration status: acceptable  Postoperative Nausea and Vomiting: none        There were no known notable events for this encounter.

## 2025-08-27 ENCOUNTER — APPOINTMENT (OUTPATIENT)
Dept: RADIOLOGY | Facility: HOSPITAL | Age: 65
End: 2025-08-27
Payer: MEDICARE

## 2025-08-27 VITALS — BODY MASS INDEX: 21.5 KG/M2 | HEIGHT: 67 IN | WEIGHT: 137 LBS

## 2025-08-27 DIAGNOSIS — Z12.31 SCREENING MAMMOGRAM FOR BREAST CANCER: ICD-10-CM

## 2025-08-27 PROCEDURE — 77067 SCR MAMMO BI INCL CAD: CPT

## 2025-08-27 PROCEDURE — 77067 SCR MAMMO BI INCL CAD: CPT | Performed by: RADIOLOGY

## 2025-08-27 PROCEDURE — 77063 BREAST TOMOSYNTHESIS BI: CPT | Performed by: RADIOLOGY
